# Patient Record
Sex: MALE | Race: WHITE | NOT HISPANIC OR LATINO | URBAN - METROPOLITAN AREA
[De-identification: names, ages, dates, MRNs, and addresses within clinical notes are randomized per-mention and may not be internally consistent; named-entity substitution may affect disease eponyms.]

---
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
## 2022-02-12 VITALS
HEIGHT: 70 IN | WEIGHT: 137.2 LBS | TEMPERATURE: 97.5 F | SYSTOLIC BLOOD PRESSURE: 110 MMHG | DIASTOLIC BLOOD PRESSURE: 58 MMHG | BODY MASS INDEX: 19.64 KG/M2 | HEART RATE: 80 BPM

## 2022-02-15 NOTE — PROGRESS NOTES
Patient:   DELONTE CRUZ            MRN: [id number]            FIN: [id number]               Age:   17 years     Sex:  Male     :  2000   Associated Diagnoses:   Well child check; Encounter for sports participation examination   Author:   Naldo Roach PA-C      Visit Information      Date of Service: 2017 08:10 am  Performing Location: [facility]  Encounter#: [id number]      Primary Care Provider (PCP):  Eloy Atkins MD    NPI# [id number]      Referring Provider:  Naldo Roach PA-C    NPI# [id number]   Visit type:  Annual exam.    Accompanied by:  No one.    Source of history:  Medical record.    Referral source:  Self.    History limitation:  None.       Chief Complaint   2017 1:05 PM CDT    Pt. here for Sports Px.        Well Adult History   Well Adult History             The patient presents for well adult exam.  The patient's general health status is described as good.  The patient's diet is described as balanced.        Review of Systems   Constitutional:  Negative.    Eye:  Negative.    Ear/Nose/Mouth/Throat:  Negative.    Respiratory:  Negative.    Cardiovascular:  Negative.    Gastrointestinal:  Negative.    Genitourinary:  Negative.    Hematology/Lymphatics:  Negative.    Endocrine:  Negative.    Immunologic:  Negative.    Musculoskeletal:  Negative.    Integumentary:  Negative.    Neurologic:  Negative.    Psychiatric:  Negative.    All other systems reviewed and negative      Health Status   Allergies:    Allergic Reactions (All)  No known allergies   Medications:  (Selected)      Problem list:    No problem items selected or recorded.      Histories   Past Medical History:    No active or resolved past medical history items have been selected or recorded.   Family History:    No family history items have been selected or recorded.   Procedure history:    No active procedure history items have been selected or recorded.   Social History:             No active social  history items have been recorded.      Physical Examination   Vital Signs   7/13/2017 1:05 PM CDT Temperature Tympanic 97.5 DegF  LOW    Peripheral Pulse Rate 80 bpm    Pulse Site Radial artery    HR Method Manual    Systolic Blood Pressure 110 mmHg    Diastolic Blood Pressure 58 mmHg    Mean Arterial Pressure 75 mmHg    BP Site Right arm    BP Method Manual      Measurements from flowsheet : Measurements   7/13/2017 1:05 PM CDT Height Measured - Standard 69.5 in    Weight Measured - Standard 137.2 lb    BSA 1.75 m2    Body Mass Index 19.97 kg/m2    Body Mass Index Percentile 39.45      General:  No acute distress.    Eye:  Pupils are equal, round and reactive to light, Extraocular movements are intact, Normal conjunctiva.    HENT:  Normocephalic, Tympanic membranes are clear, Oral mucosa is moist, No pharyngeal erythema.    Neck:  Supple, Non-tender, No lymphadenopathy, No thyromegaly.    Respiratory:  Lungs are clear to auscultation, Respirations are non-labored, Breath sounds are equal.    Cardiovascular:  Normal rate, Regular rhythm, No murmur.    Gastrointestinal:  Soft, Non-tender, Non-distended, Normal bowel sounds, No organomegaly.    Genitourinary:  No costovertebral angle tenderness.    Integumentary:  No rash.    Neurologic:  No focal deficits.    Psychiatric:  Cooperative, Appropriate mood & affect.       Health Maintenance      Recommendations     Pending (in the next year)        Due            Well Child 2 yrs - 18 yrs due  07/13/17  and every 1  year(s)     Satisfied (in the past 1 year)        Satisfied            Body Mass Index Check (Male) on  07/13/17.          Impression and Plan   PHQ-9 and Cage screening performed and reviewed.   Diagnosis     Well child check (KPM07-HI Z00.129).     Encounter for sports participation examination ([id number]-FS Z02.5).     Patient Instructions:       Counseled: Patient, Family, Regarding diagnosis.    Cleared without restriction.